# Patient Record
Sex: FEMALE | Race: WHITE
[De-identification: names, ages, dates, MRNs, and addresses within clinical notes are randomized per-mention and may not be internally consistent; named-entity substitution may affect disease eponyms.]

---

## 2017-09-08 ENCOUNTER — HOSPITAL ENCOUNTER (EMERGENCY)
Dept: HOSPITAL 80 - FED | Age: 67
Discharge: HOME | End: 2017-09-08
Payer: MEDICAID

## 2017-09-08 VITALS
HEART RATE: 75 BPM | TEMPERATURE: 98.4 F | RESPIRATION RATE: 18 BRPM | SYSTOLIC BLOOD PRESSURE: 118 MMHG | DIASTOLIC BLOOD PRESSURE: 63 MMHG | OXYGEN SATURATION: 94 %

## 2017-09-08 DIAGNOSIS — E11.9: ICD-10-CM

## 2017-09-08 DIAGNOSIS — Z79.4: ICD-10-CM

## 2017-09-08 DIAGNOSIS — E86.9: ICD-10-CM

## 2017-09-08 DIAGNOSIS — Z79.84: ICD-10-CM

## 2017-09-08 DIAGNOSIS — H81.399: Primary | ICD-10-CM

## 2017-09-08 LAB
% IMMATURE GRANULYOCYTES: 0.2 % (ref 0–1.1)
ABSOLUTE IMMATURE GRANULOCYTES: 0.02 10^3/UL (ref 0–0.1)
ABSOLUTE NRBC COUNT: 0 10^3/UL (ref 0–0.01)
ADD DIFF?: NO
ADD MORPH?: NO
ADD SCAN?: NO
ANION GAP SERPL CALC-SCNC: 12 MEQ/L (ref 8–16)
ATYPICAL LYMPHOCYTE FLAG: 20 (ref 0–99)
CALCIUM SERPL-MCNC: 10.2 MG/DL (ref 8.5–10.4)
CHLORIDE SERPL-SCNC: 104 MEQ/L (ref 97–110)
CO2 SERPL-SCNC: 22 MEQ/L (ref 22–31)
CREAT SERPL-MCNC: 0.5 MG/DL (ref 0.6–1)
ERYTHROCYTE [DISTWIDTH] IN BLOOD BY AUTOMATED COUNT: 14.2 % (ref 11.5–15.2)
FRAGMENT RBC FLAG: 0 (ref 0–99)
GFR SERPL CREATININE-BSD FRML MDRD: > 60 ML/MIN/{1.73_M2}
GLUCOSE SERPL-MCNC: 315 MG/DL (ref 70–100)
HCT VFR BLD CALC: 39 % (ref 38–47)
HGB BLD-MCNC: 13.1 G/DL (ref 12.6–16.3)
LEFT SHIFT FLG: 0 (ref 0–99)
LIPEMIA HEMOLYSIS FLAG: 80 (ref 0–99)
MCH RBC BLDCO QN: 30.5 PG (ref 27.9–34.1)
MCHC RBC AUTO-ENTMCNC: 33.6 G/DL (ref 32.4–36.7)
MCV RBC AUTO: 90.9 FL (ref 81.5–99.8)
NRBC-AUTO%: 0 % (ref 0–0.2)
PLATELET # BLD: 90 10^3/UL (ref 150–400)
PLATELET CLUMPS FLAG: 10 (ref 0–99)
PMV BLD AUTO: 13 FL (ref 8.7–11.7)
POTASSIUM SERPL-SCNC: 4.1 MEQ/L (ref 3.5–5.2)
RBC # BLD AUTO: 4.29 10^6/UL (ref 4.18–5.33)
SODIUM SERPL-SCNC: 138 MEQ/L (ref 134–144)

## 2017-09-08 NOTE — CPEKG
Heart Rate: 79

RR Interval: 759

P-R Interval: 172

QRSD Interval: 88

QT Interval: 400

QTC Interval: 459

P Axis: 62

QRS Axis: -28

T Wave Axis: 66

EKG Severity - OTHERWISE NORMAL ECG -

EKG Impression: SINUS RHYTHM

EKG Impression: BORDERLINE LEFT AXIS DEVIATION

Electronically Signed By: McCollester, Laughlin 08-Sep-2017 15:58:20

## 2018-04-04 ENCOUNTER — HOSPITAL ENCOUNTER (EMERGENCY)
Dept: HOSPITAL 80 - FED | Age: 68
Discharge: HOME | End: 2018-04-04
Payer: MEDICAID

## 2018-04-04 VITALS
DIASTOLIC BLOOD PRESSURE: 57 MMHG | SYSTOLIC BLOOD PRESSURE: 129 MMHG | OXYGEN SATURATION: 93 % | RESPIRATION RATE: 16 BRPM | HEART RATE: 81 BPM

## 2018-04-04 VITALS — TEMPERATURE: 99.5 F

## 2018-04-04 DIAGNOSIS — J18.9: Primary | ICD-10-CM

## 2018-04-04 DIAGNOSIS — I10: ICD-10-CM

## 2018-04-04 DIAGNOSIS — Z79.4: ICD-10-CM

## 2018-04-04 DIAGNOSIS — E11.9: ICD-10-CM

## 2018-04-04 NOTE — EDPHY
H & P


Time Seen by Provider: 04/04/18 15:35


HPI/ROS: 





CHIEF COMPLAINT:  Cough, fever





Limitations:  Bosnian speaking only, grandson as 





HISTORY OF PRESENT ILLNESS:  68-year-old female presents with cough and fever.  

Onset sore throat and runny nose 1 week ago.  Associated with gradually 

increasing cough, now productive of yellowish phlegm.  Associated with 

subjective fever.  She also has right-sided chest pain with coughing only.  No 

shortness of breath.  No prior history of pneumonia.





REVIEW OF SYSTEMS:





Eyes:  No visual changes


Gastrointestinal:  No nausea, no vomiting, no abdominal pain


Genitourinary:  No hematuria, no dysuria


Musculoskeletal:  No myalgias


Skin:  No rash


Neurological:  No headache, no numbness, no weakness


Psychiatric:  No depression





Past Medical/Surgical History: 





Diabetes


Hypertension





Social History: 





Lives in own home





Smoking Status: Never smoked


Physical Exam: 





General Appearance:  Alert, pleasant, nontoxic-appearing


Eyes:  Pupils equal and round, no conjunctival pallor or injection


ENT, Mouth:  Mucous membranes moist


Neck:  Normal inspection


Respiratory:  Rales at the right base


Cardiovascular:  Regular rate and rhythm


Gastrointestinal:  Abdomen is soft and nontender


Neurological:  A&O, nonfocal, normal gait


Skin:  Warm and dry


Extremities:  Normal inspection


Psychiatric:  Mood and affect normal





Constitutional: 


 Initial Vital Signs











Temperature (C)  37.5 C   04/04/18 13:52


 


Heart Rate  85   04/04/18 13:52


 


Respiratory Rate  17   04/04/18 13:52


 


Blood Pressure  146/58 H  04/04/18 13:52


 


O2 Sat (%)  92   04/04/18 13:52








 











O2 Delivery Mode               Room Air














Allergies/Adverse Reactions: 


 





No Known Allergies Allergy (Verified 04/04/18 13:51)


 








Home Medications: 














 Medication  Instructions  Recorded


 


Levothyroxine [Synthroid 150 mcg 150 mcg PO DAILY06 04/07/12





(RX)]  


 


Lisinopril [Zestril 10 mg (RX)] 10 mg PO DAILY PRN 04/07/12


 


Simvastatin [Zocor 20 mg (RX)] 20 mg PO DAILY18 08/30/12


 


metFORMIN HCL [Glucophage 500 mg 1,000 mg PO BIDMEAL 08/30/12





(RX)]  


 


Dm/P-Ephed/Acetaminoph/Doxylam 15 - 30 ml PO Q6 PRN 11/17/13





[Nyquil D Cold & Flu Liquid]  


 


Ibuprofen [Motrin 200 mg (OTC)] 400 mg PO Q6 PRN 11/17/13


 


Insulin Aspart [Novolog] 40 unit SC DAILY@18 11/17/13


 


Insulin Aspart [Novolog] 60 unit SC DAILY@08 11/17/13


 


Pantoprazole Sodium [Protonix 40mg 40 mg PO DAILY PRN 11/17/13





(RX)]  


 


clonazePAM [Klonopin (RX)] 1 mg PO HS 11/17/13


 


Ondansetron Odt [Zofran Odt] 4 mg PO Q4PRN PRN #20 tab 12/11/16


 


Meclizine HCl [Antivert] 25 mg PO QID PRN #14 tablet 09/08/17


 


Doxycycline Hyclate 100 mg PO BID #20 tablet 04/04/18


 


Hydrocodone/APAP 5/325 [Norco 1 - 2 tab PO Q4H PRN #10 tab 04/04/18





5/325]  














Medical Decision Making





- Diagnostics


Imaging Results: 


Chest x-ray reveals right lower lobe atelectasis versus infiltrate





Imaging: I viewed and interpreted images myself


ED Course/Re-evaluation: 





This patient presents with upper respiratory symptoms, rales at the right base 

and a chest x-ray reveals atelectasis versus infiltrate at the right base.  

Will treat with doxycycline for possible early pneumonia.  She is clinically 

stable, without hypoxia or vomiting.  Follow-up with primary care physician in 

2 days.


Differential Diagnosis: 





Differential diagnosis includes but is not limited to pneumonia, otitis media, 

peritonsillar abscess, retropharyngeal abscess, meningitis.








- Data Points


Medications Given: 


 








Discontinued Medications





Acetaminophen (Tylenol)  650 mg PO EDNOW ONE


   Stop: 04/04/18 15:41


   Last Admin: 04/04/18 16:14 Dose:  650 mg








Departure





- Departure


Disposition: Home, Routine, Self-Care


Clinical Impression: 


 Pneumonia





Condition: Good


Instructions:  Bacterial Pneumonia (ED)


Additional Instructions: 


Take Tylenol 650 mg every 4 hr as needed for fever and pain.


Norco 1 tablet orally at bedtime for fever and cough.


Take the antibiotics as prescribed.


Return for worsening symptoms, shortness of breath or any concerns.


Follow-up with her primary care physician in 2 days for recheck.





Referrals: 


Marge Diaz MD [Primary Care Provider] - As per Instructions


Prescriptions: 


Doxycycline Hyclate 100 mg PO BID #20 tablet


Hydrocodone/APAP 5/325 [Norco 5/325] 1 - 2 tab PO Q4H PRN #10 tab


 PRN Reason: Pain, Moderate

## 2018-04-17 ENCOUNTER — HOSPITAL ENCOUNTER (OUTPATIENT)
Dept: HOSPITAL 80 - FIMAGING | Age: 68
End: 2018-04-17
Attending: FAMILY MEDICINE
Payer: MEDICAID

## 2018-04-17 DIAGNOSIS — J98.11: ICD-10-CM

## 2018-04-17 DIAGNOSIS — J42: Primary | ICD-10-CM

## 2019-01-22 ENCOUNTER — HOSPITAL ENCOUNTER (OUTPATIENT)
Dept: HOSPITAL 80 - FIMAGING | Age: 69
End: 2019-01-22
Attending: FAMILY MEDICINE
Payer: MEDICAID

## 2019-01-22 DIAGNOSIS — R92.8: ICD-10-CM

## 2019-01-22 DIAGNOSIS — Z12.31: Primary | ICD-10-CM

## 2019-02-12 ENCOUNTER — HOSPITAL ENCOUNTER (OUTPATIENT)
Dept: HOSPITAL 80 - FIMAGING | Age: 69
End: 2019-02-12
Attending: FAMILY MEDICINE
Payer: MEDICAID

## 2019-02-12 DIAGNOSIS — N63.21: Primary | ICD-10-CM

## 2019-03-04 ENCOUNTER — HOSPITAL ENCOUNTER (OUTPATIENT)
Dept: HOSPITAL 80 - FIMAGING | Age: 69
End: 2019-03-04
Attending: FAMILY MEDICINE
Payer: MEDICAID

## 2019-03-04 DIAGNOSIS — N63.21: Primary | ICD-10-CM

## 2019-03-04 PROCEDURE — 0HBU3ZX EXCISION OF LEFT BREAST, PERCUTANEOUS APPROACH, DIAGNOSTIC: ICD-10-PCS | Performed by: RADIOLOGY

## 2019-04-19 ENCOUNTER — HOSPITAL ENCOUNTER (OUTPATIENT)
Dept: HOSPITAL 80 - FIMAGING | Age: 69
Discharge: HOME | End: 2019-04-19
Attending: SURGERY
Payer: MEDICAID

## 2019-04-19 VITALS — SYSTOLIC BLOOD PRESSURE: 87 MMHG | DIASTOLIC BLOOD PRESSURE: 47 MMHG

## 2019-04-19 DIAGNOSIS — E11.9: ICD-10-CM

## 2019-04-19 DIAGNOSIS — C50.912: Primary | ICD-10-CM

## 2019-04-19 DIAGNOSIS — Z86.711: ICD-10-CM

## 2019-04-19 DIAGNOSIS — E89.0: ICD-10-CM

## 2019-04-19 DIAGNOSIS — Z96.652: ICD-10-CM

## 2019-04-19 DIAGNOSIS — E78.5: ICD-10-CM

## 2019-04-19 DIAGNOSIS — Z86.011: ICD-10-CM

## 2019-04-19 PROCEDURE — 76098 X-RAY EXAM SURGICAL SPECIMEN: CPT

## 2019-04-19 PROCEDURE — 19301 PARTIAL MASTECTOMY: CPT

## 2019-04-19 PROCEDURE — 0HBU0ZZ EXCISION OF LEFT BREAST, OPEN APPROACH: ICD-10-PCS | Performed by: SURGERY

## 2019-04-19 PROCEDURE — A9520 TC99 TILMANOCEPT DIAG 0.5MCI: HCPCS

## 2019-04-19 PROCEDURE — 07B60ZX EXCISION OF LEFT AXILLARY LYMPHATIC, OPEN APPROACH, DIAGNOSTIC: ICD-10-PCS | Performed by: SURGERY

## 2019-04-19 PROCEDURE — BH01ZZZ PLAIN RADIOGRAPHY OF LEFT BREAST: ICD-10-PCS | Performed by: SURGERY

## 2019-04-19 PROCEDURE — 78195 LYMPH SYSTEM IMAGING: CPT

## 2019-04-19 PROCEDURE — 3E0W3KZ INTRODUCTION OF OTHER DIAGNOSTIC SUBSTANCE INTO LYMPHATICS, PERCUTANEOUS APPROACH: ICD-10-PCS | Performed by: SURGERY

## 2019-04-19 PROCEDURE — 19281 PERQ DEVICE BREAST 1ST IMAG: CPT

## 2019-04-19 PROCEDURE — 38500 BIOPSY/REMOVAL LYMPH NODES: CPT

## 2019-04-19 NOTE — PDANEPAE
ANE History of Present Illness





left lumpectomy





ANE Past Medical History





- Cardiovascular History


Hx Hypertension: Yes


Hx Arrhythmias: No


Hx Chest Pain: No


Hx Coronary Artery / Peripheral Vascular Disease: No


Hx CHF / Valvular Disease: No


Hx Palpitations: No


Cardiovascular History Comment: NO CP OR SOB.  HYPERLIPIDEMIA





- Pulmonary History


Hx COPD: No


Hx Asthma/Reactive Airway Disease: No


Hx Recent Upper Respiratory Infection: No


Hx Oxygen in Use at Home: No


Hx Sleep Apnea: No


Sleep Apnea Screening Result - Last Documented: Positive


Pulmonary History Comment: PE 2006 - TXD W/ANTICOAG THERAPY - NO RECURRENCE.  

SINUS MICHELLE





- Neurologic History


Hx Cerebrovascular Accident: No


Hx Seizures: No


Hx Dementia: No





- Endocrine History


Hx Diabetes: Yes


Endocrine History Comment: DMII.  THYROIDECTOMY





- Renal History


Hx Renal Disorders: No


Renal History Comment: OCCAS UTI





- Liver History


Hx Hepatic Disorders: No





- Neurological & Psychiatric Hx


Hx Neurological and Psychiatric Disorders: Yes


Neurological / Psychiatric History Comment: ANXIETY





- Cancer History


Hx Cancer: Yes


Cancer History Comment: THYROID





- Congenital Disorder History


Hx Congenital Disorders: No





- GI History


Hx Gastrointestinal Disorders: Yes


Gastrointestinal History Comment: ACID REFLUX





- Other Health History


Other Health History: OCCAS EDEMA MARC ANKLES





- Surgical History


Prior Surgeries: L TKA.  L KNEE SCOPE.  MENINGIOMA INTRACRANIAL -  RESECTED X2.

  THYROIDECTOMY





ANE Review of Systems


Review of systems is: negative


Review of Systems: 








- Exercise capacity


METS (RN): 4 METS





ANE Patient History





- Allergies


Allergies/Adverse Reactions: 








No Known Allergies Allergy (Verified 04/04/18 13:51)


 








- Home Medications


Home medications: home medication list seen and reviewed


Home Medications: 








Levothyroxine [Synthroid 150 mcg (RX)] 150 mcg PO DAILY06 04/07/12 [Last Taken 

04/19/19]


Lisinopril [Zestril 10 mg (RX)] 10 mg PO DAILY PRN 04/07/12 [Last Taken 04/18/19

]


metFORMIN HCL [Glucophage 500 mg (RX)] 1,000 mg PO BIDMEAL 08/30/12 [Last Taken 

04/18/19]


Ibuprofen [Motrin 200 mg (OTC)] 400 mg PO Q6 PRN 11/17/13 [Last Taken 04/09/19]


Insulin Aspart [Novolog] 40 unit SC DAILY@18 11/17/13 [Last Taken 04/18/19]


Insulin Aspart [Novolog] 60 unit SC DAILY@08 11/17/13 [Last Taken 04/18/19]


Pantoprazole Sodium [Protonix 40mg (RX)] 40 mg PO DAILY PRN 11/17/13 [Last 

Taken 04/19/19]


clonazePAM [Klonopin (RX)] 1 mg PO HS 11/17/13 [Last Taken 04/18/19]


Atorvastatin Calcium  04/08/19 [Last Taken 04/18/19]


Furosemide  04/08/19 [Last Taken 04/18/19]


Metoprolol Succinate  04/08/19 [Last Taken Unknown]








- NPO status


NPO Since - Liquids (Date): 04/19/19


NPO Since - Liquids (Time): 05:30


NPO Since - Solids (Date): 04/18/19


NPO Since - Solids (Time): 21:00





- Smoking Hx


Smoking Status: Never smoked





ANE Labs/Vital Signs





- Vital Signs


Blood Pressure: 118/56


Heart Rate: 78


Respiratory Rate: 18


O2 Sat (%): 94


Height: 162.56 cm


Weight: 99.79 kg





ANE Physical Exam





- Airway


Neck exam: FROM


Mouth exam: dentures





- Pulmonary


Pulmonary: no respiratory distress





- Cardiovascular


Cardiovascular: regular rate and rhythym





- ASA Status


ASA Status: III





ANE Anesthesia Plan


Anesthesia Plan: GA w LMA

## 2019-04-20 NOTE — GOP
[f 
rep st]



                                                                OPERATIVE REPORT





DATE OF OPERATION:  04/19/2019



SURGEON:  Elizabeth Gutierrez MD



ANESTHESIA:  General.



ANESTHESIOLOGIST:  Giorgi Zapien MD.



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



PROCEDURE PERFORMED:  Left breast needle localized lumpectomy with left 
sentinel lymph node.



FINDINGS:  Clip within the specimen.



SPECIMENS:  Left breast lumpectomy, additional margins, left sentinel lymph 
node.



ESTIMATED BLOOD LOSS:  10 cc.



INDICATIONS:  The patient is a 69-year-old with invasive ductal carcinoma.



DESCRIPTION OF PROCEDURE:  The patient was brought into the operating room, 
placed supine on the table, and general anesthesia was administered.  Her left 
breast was prepped and draped in the usual sterile fashion.  I infiltrated all 
sites with 0.5% Marcaine prior to making incisions.  I made ellipse around the 
wire and created superior and inferior skin flaps.  I dissected down beyond the 
level of the wire.  I marked this green anterior, red superior, yellow lateral, 
blue inferior, orange medial, black posterior.  I submitted this to Radiology 
and the clip and wire contained within the specimen.  I took an additional 
superior margin inked red, medial yellow, inferior blue, lateral orange, 
posterior black.  Hemostasis achieved.  Clips placed in the cavity.  The deep 
layer closed with 3-0 Vicryl, skin closed with 3-0 Vicryl followed by 4-0 
Monocryl.  I made an incision beneath the hair-bearing portion in her axilla.  
I dissected down through the skin, subcutaneous space.  I entered into the 
axillary space.  I used a gamma probe to identify the sentinel lymph node;  it 
measured over 2000.  I excised this.  Hemostasis achieved in the axilla.  The 
background was quiet.  Katty was placed in the axilla.  The wound was closed 
with 3-0 Vicryl followed by 4-0 Monocryl.  Mastisol, Steri-Strips, sterile 
dressings applied.  She was awakened in the operating room, extubated, 
transferred to PACU in stable condition.





Job #:  [id number]

MTDD

## 2021-07-05 NOTE — EDPHY
H & P


Time Seen by Provider: 09/08/17 14:36


HPI/ROS: 





HPI


Headache, dizziness.





67-year-old female by private vehicle with her grandson.  This patient reports 

that she developed dizziness which she describes as the room spinning onset 

about 3 hours prior to arrival.  Symptoms worse with head movement.  She also 

describes having a dull suboccipital/occipital headache.  She describes this as 

gradual in in onset.  She describes it as dull and mild to moderate at this 

time.  She has had difficulty with balance secondary to her dizziness.  She 

denies any focal weakness or loss of sensation in her extremities.  She reports 

some intermittent blurry vision.  She has a prior history of a brain malignancy 

with surgery which was performed at [facility] in [place].





ROS:





Constitutional:  No fever, no chills.  As above.


Eyes:  No discharge.  Above.


ENT:  No sore throat.  No nasal congestion or rhinorrhea.


Respiratory:  No cough.  No shortness of breath.


Cardiac:  No chest pain, no palpitations.


Gastrointestinal:  No abdominal pain, no vomiting, no diarrhea.


Genitourinary:  No hematuria.  No dysuria or increased frequency with urination.


Musculoskeletal:  No back pain.  No neck pain.  No myalgias or arthralgias.


Skin:  No rashes.


Neurological:  As above.  No focal weakness or altered sensation.





Past medical history:  Type 2 diabetes, hyperlipidemia, hypothyroidism, brain 

tumor with surgeries, thyroidectomy, left knee replacement.





Social history:  From [facility].  Does not speak English.  Denies smoking.  No 

alcohol.  Here with her grandson.  Currently lives with family.  Ambulates 

without a cane.





Physical Exam:





General Appearance:  Alert, no distress.  This patient is responding to 

questions appropriately and in full sentences.  This patient appears well-

hydrated and well-nourished.


Eyes:  Pupils equal and round no pallor or injection.  No lid edema, erythema 

or injection.  No nystagmus.  No photophobia.  Mild catch-up saccades on head 

impulse testing.


Respiratory:  There are no retractions, lungs are clear to auscultation with 

good air movement bilaterally.


Cardiovascular:  Regular rate and rhythm.  No murmur.


Gastrointestinal:  Abdomen is soft and nontender, no masses, bowel sounds 

normal.  No focal tenderness at McBurney's point.  No Patten sign.


Neurological:  Motor sensory function is grossly intact.  Cranial nerves are 

normal.  Gait is baseline according to her son who witnessed her ambulate in 

the emergency department.


Skin:  Warm and dry, no rashes.


Musculoskeletal:  Neck is supple and nontender.  No suboccipital tenderness on 

palpation.  No tenderness over the lateral soft tissues of the neck.  No pain 

on flexion of the neck.


Extremities are symmetrical.  All joints range without pain or impingement.


Psychiatric:  No agitation.  No depression.





Database:





EKG:





EKG time is 3:22 p.m.; EKG shows a narrow complex normal sinus rhythm with a 

ventricular rate of 79.  The NC, QRS, QT intervals are within normal limits.  

There are no ST-T wave changes indicative of ischemic or injury pattern.  No 

evidence of right heart strain.  Interpreted by me.





Imaging:





CT angiogram of head and neck:  Encephalomalacia noted from previous surgical 

procedure.  No hemorrhage.  No evidence of vertebral artery or carotid artery 

dissection.  No significant acute findings.  Results were discussed with staff 

radiologist.





Procedures:





Emergency department course:





IV placed.  Vital signs reviewed.  She was placed on a cardiac monitor.  She 

was started on IV normal saline with 500 cc to be given over the next hour.  

She will be given 25 mg of oral meclizine.  Given her history of intracranial 

mass as well as symptoms today we will obtain CT angiogram of her head neck.





3:25 p.m., patient re-evaluated.  Repeat neurologic Assessment is nonfocal and 

as above.  Her presentation, physical exam findings and emergency department 

workup are reassuring for a peripheral etiology of vertigo.  Results of 

laboratory work, EKG and CT angiograms of the head neck discussed with her and 

her son.  She is feeling better now in the emergency department.  She is up and 

ambulatory with her baseline gait and without assistance.  She and her grandson 

feel comfortable being discharged.  I discussed follow-up with her primary care 

physician Dr. Marge Diaz with the [facility].  She feels comfortable 

going home.  Return to emergency department precautions were reviewed in 

detail.  All of her questions were answered.  She was discharged in good 

condition.





Differential Diagnosis:





The differential diagnosis on this patient includes but is not limited to 

benign positional vertigo, acute labyrinthitis.  Carotid artery dissection, 

vertebral artery dissection, CVA, new or changing intracranial mass unlikely.  

This represents a partial list of diagnoses considered.  These considerations 

are based on history, physical exam, past history, reassessment and diagnostic 

testing.


Smoking Status: Never smoked


Constitutional: 


 Initial Vital Signs











Temperature (C)  36.5 C   09/08/17 14:28


 


Heart Rate  90   09/08/17 14:28


 


Respiratory Rate  20   09/08/17 14:28


 


Blood Pressure  133/105 H  09/08/17 14:28


 


O2 Sat (%)  91 L  09/08/17 14:28








 











O2 Delivery Mode               Room Air














Allergies/Adverse Reactions: 


 





No Known Allergies Allergy (Verified 12/11/16 13:44)


 








Home Medications: 














 Medication  Instructions  Recorded


 


Levothyroxine [Synthroid 150 mcg 150 mcg PO DAILY06 04/07/12





(RX)]  


 


Lisinopril [Zestril 10 mg (RX)] 10 mg PO DAILY PRN 04/07/12


 


Simvastatin [Zocor 20 mg (RX)] 20 mg PO DAILY18 08/30/12


 


metFORMIN HCL [Glucophage 500 mg 1,000 mg PO BIDMEAL 08/30/12





(RX)]  


 


Dm/P-Ephed/Acetaminoph/Doxylam 15 - 30 ml PO Q6 PRN 11/17/13





[Nyquil D Cold & Flu Liquid]  


 


Ibuprofen [Motrin 200 mg (OTC)] 400 mg PO Q6 PRN 11/17/13


 


Insulin Aspart [Novolog] 40 unit SC DAILY@18 11/17/13


 


Insulin Aspart [Novolog] 60 unit SC DAILY@08 11/17/13


 


Pantoprazole Sodium [Protonix 40mg 40 mg PO DAILY PRN 11/17/13





(RX)]  


 


clonazePAM [Klonopin (RX)] 1 mg PO HS 11/17/13


 


Cephalexin [Keflex] 500 mg PO TID #15 cap 12/11/16


 


Ondansetron Odt [Zofran Odt] 4 mg PO Q4PRN PRN #20 tab 12/11/16


 


Meclizine HCl [Antivert] 25 mg PO QID PRN #14 tablet 09/08/17














Medical Decision Making





- Diagnostics


Imaging Results: 


 Imaging Impressions





Head CTA  09/08/17 14:48


Impression: 


1. Normal CT angiogram of the neck. There is some tortuosity of the proximal to 

mid ICA bilaterally.


2. Normal CT angiogram of the [place], as detailed above. Normal 

variation around the [place].


3. Previous craniotomy on the right with underlying encephalomalacia and 

gliosis right posterior parietal to superior occipital lobe.


 


Note:  All calculations were performed using NASCET criteria. 


 


Findings discussed with Laughlin B McCollester, MD  at  17:17 hour, 9/8/2017.


 


 


 








Neck CTA  09/08/17 14:48


Impression: 


1. Normal CT angiogram of the neck. There is some tortuosity of the proximal to 

mid ICA bilaterally.


2. Normal CT angiogram of the [place], as detailed above. Normal 

variation around the [place].


3. Previous craniotomy on the right with underlying encephalomalacia and 

gliosis right posterior parietal to superior occipital lobe.


 


Note:  All calculations were performed using NASCET criteria. 


 


Findings discussed with Laughlin B McCollester, MD  at  17:17 hour, 9/8/2017.


 


 


 








Head CT  09/08/17 15:49


Impression:


1. No new intracranial abnormality seen. 


2. Stable encephalomalacia and gliosis right posterior parietal to superior 

occipital lobe from previous surgery with overlying craniotomy.


 


CT angiogram procedure has been ordered.


 


 














- Data Points


Laboratory Results: 


 Laboratory Results





 09/08/17 15:05 





 09/08/17 15:05 





 











  09/08/17 09/08/17





  15:05 15:05


 


WBC    8.75 10^3/uL 10^3/uL





    (3.80-9.50) 


 


RBC    4.29 10^6/uL 10^6/uL





    (4.18-5.33) 


 


Hgb    13.1 g/dL g/dL





    (12.6-16.3) 


 


Hct    39.0 % %





    (38.0-47.0) 


 


MCV    90.9 fL fL





    (81.5-99.8) 


 


MCH    30.5 pg pg





    (27.9-34.1) 


 


MCHC    33.6 g/dL g/dL





    (32.4-36.7) 


 


RDW    14.2 % %





    (11.5-15.2) 


 


Plt Count    90 10^3/uL L 10^3/uL





    (150-400) 


 


MPV    13.0 fL H fL





    (8.7-11.7) 


 


Neut % (Auto)    55.1 % %





    (39.3-74.2) 


 


Lymph % (Auto)    31.0 % %





    (15.0-45.0) 


 


Mono % (Auto)    10.9 % %





    (4.5-13.0) 


 


Eos % (Auto)    2.2 % %





    (0.6-7.6) 


 


Baso % (Auto)    0.6 % %





    (0.3-1.7) 


 


Nucleat RBC Rel Count    0.0 % %





    (0.0-0.2) 


 


Absolute Neuts (auto)    4.83 10^3/uL 10^3/uL





    (1.70-6.50) 


 


Absolute Lymphs (auto)    2.71 10^3/uL 10^3/uL





    (1.00-3.00) 


 


Absolute Monos (auto)    0.95 10^3/uL H 10^3/uL





    (0.30-0.80) 


 


Absolute Eos (auto)    0.19 10^3/uL 10^3/uL





    (0.03-0.40) 


 


Absolute Basos (auto)    0.05 10^3/uL 10^3/uL





    (0.02-0.10) 


 


Absolute Nucleated RBC    0.00 10^3/uL 10^3/uL





    (0-0.01) 


 


Immature Gran %    0.2 % %





    (0.0-1.1) 


 


Immature Gran #    0.02 10^3/uL 10^3/uL





    (0.00-0.10) 


 


Sodium  138 mEq/L mEq/L  





   (134-144)  


 


Potassium  4.1 mEq/L mEq/L  





   (3.5-5.2)  


 


Chloride  104 mEq/L mEq/L  





   ()  


 


Carbon Dioxide  22 mEq/l mEq/l  





   (22-31)  


 


Anion Gap  12 mEq/L mEq/L  





   (8-16)  


 


BUN  15 mg/dL mg/dL  





   (7-23)  


 


Creatinine  0.5 mg/dL L mg/dL  





   (0.6-1.0)  


 


Estimated GFR  > 60   





   


 


Glucose  315 mg/dL H mg/dL  





   ()  


 


Calcium  10.2 mg/dL mg/dL  





   (8.5-10.4)  











Medications Given: 


 








Discontinued Medications





Sodium Chloride (Ns)  500 mls @ 0 mls/hr IV ONCE ONE; Wide Open


   PRN Reason: Protocol


   Stop: 09/08/17 14:48


   Last Admin: 09/08/17 15:09 Dose:  500 mls


Meclizine HCl (Meclizine Hcl)  25 mg PO EDNOW ONE


   Stop: 09/08/17 17:25


   Last Admin: 09/08/17 17:30 Dose:  25 mg


Ondansetron HCl (Zofran)  4 mg IVP EDNOW ONE


   Stop: 09/08/17 14:48


   Last Admin: 09/08/17 15:14 Dose:  4 mg








Departure





- Departure


Disposition: Home, Routine, Self-Care


Clinical Impression: 


 Peripheral vertigo





Condition: Good


Instructions:  Dizziness (ED)


Additional Instructions: 


Read and follow provided instructions.





Follow-up with your primary care physician, Dr. Claudy Diaz at the [facility] 

[facility], in 1-2 days for re-evaluation.





Take medication as prescribed for vertigo.





Return to the emergency department for worsening symptoms, worsening headache, 

worsening dizziness or lightheadedness, loss of sensation or weakness in your 

extremities or other serious concerns.


Referrals: 


Marge Diaz MD [Primary Care Provider] - As per Instructions


Prescriptions: 


Meclizine HCl [Antivert] 25 mg PO QID PRN #14 tablet


 PRN Reason: Dizziness No